# Patient Record
Sex: MALE | Race: WHITE | NOT HISPANIC OR LATINO | Employment: OTHER | URBAN - METROPOLITAN AREA
[De-identification: names, ages, dates, MRNs, and addresses within clinical notes are randomized per-mention and may not be internally consistent; named-entity substitution may affect disease eponyms.]

---

## 2024-04-15 ENCOUNTER — TELEPHONE (OUTPATIENT)
Dept: UROLOGY | Facility: CLINIC | Age: 54
End: 2024-04-15
Payer: COMMERCIAL

## 2024-04-15 NOTE — TELEPHONE ENCOUNTER
----- Message from Jacquie Brunner sent at 4/15/2024  7:32 AM CDT -----  Contact: Pt's wife  Type: Needs Medical Advice    Who Called:  Patient's wife  What is this regarding?:  She is okay seeing another Dr or Np if he can get in before 5/8/24.  Best Call Back Number:  [phone]  Additional Information:  Please call the patient back at the phone number listed above to advise. Thank you!

## 2024-04-24 ENCOUNTER — TELEPHONE (OUTPATIENT)
Dept: UROLOGY | Facility: CLINIC | Age: 54
End: 2024-04-24
Payer: COMMERCIAL

## 2024-04-24 NOTE — TELEPHONE ENCOUNTER
----- Message from Renita Peter sent at 4/24/2024 11:11 AM CDT -----  Contact: Self  Type:  Patient Returning Call    Who Called:  Patient  Who Left Message for Patient:  Dalia  Does the patient know what this is regarding?:  yes  Best Call Back Number:  [phone]  Additional Information:  Thank You

## 2024-04-30 ENCOUNTER — TELEPHONE (OUTPATIENT)
Dept: UROLOGY | Facility: CLINIC | Age: 54
End: 2024-04-30
Payer: COMMERCIAL

## 2024-04-30 ENCOUNTER — OFFICE VISIT (OUTPATIENT)
Dept: UROLOGY | Facility: CLINIC | Age: 54
End: 2024-04-30
Payer: COMMERCIAL

## 2024-04-30 VITALS — HEIGHT: 65 IN | WEIGHT: 175.25 LBS | BODY MASS INDEX: 29.2 KG/M2

## 2024-04-30 DIAGNOSIS — Z87.440 RECENT URINARY TRACT INFECTION: ICD-10-CM

## 2024-04-30 DIAGNOSIS — R97.20 ELEVATED PSA: Primary | ICD-10-CM

## 2024-04-30 LAB
BILIRUBIN, UA POC OHS: NEGATIVE
BLOOD, UA POC OHS: NEGATIVE
CLARITY, UA POC OHS: CLEAR
COLOR, UA POC OHS: YELLOW
GLUCOSE, UA POC OHS: NEGATIVE
KETONES, UA POC OHS: NEGATIVE
LEUKOCYTES, UA POC OHS: NEGATIVE
NITRITE, UA POC OHS: NEGATIVE
PH, UA POC OHS: 6
PROTEIN, UA POC OHS: NEGATIVE
SPECIFIC GRAVITY, UA POC OHS: 1.01
UROBILINOGEN, UA POC OHS: 0.2

## 2024-04-30 PROCEDURE — 4010F ACE/ARB THERAPY RXD/TAKEN: CPT | Mod: CPTII,S$GLB,, | Performed by: STUDENT IN AN ORGANIZED HEALTH CARE EDUCATION/TRAINING PROGRAM

## 2024-04-30 PROCEDURE — 99999 PR PBB SHADOW E&M-EST. PATIENT-LVL III: CPT | Mod: PBBFAC,,, | Performed by: STUDENT IN AN ORGANIZED HEALTH CARE EDUCATION/TRAINING PROGRAM

## 2024-04-30 PROCEDURE — 1159F MED LIST DOCD IN RCRD: CPT | Mod: CPTII,S$GLB,, | Performed by: STUDENT IN AN ORGANIZED HEALTH CARE EDUCATION/TRAINING PROGRAM

## 2024-04-30 PROCEDURE — 81003 URINALYSIS AUTO W/O SCOPE: CPT | Mod: QW,S$GLB,, | Performed by: STUDENT IN AN ORGANIZED HEALTH CARE EDUCATION/TRAINING PROGRAM

## 2024-04-30 PROCEDURE — 1160F RVW MEDS BY RX/DR IN RCRD: CPT | Mod: CPTII,S$GLB,, | Performed by: STUDENT IN AN ORGANIZED HEALTH CARE EDUCATION/TRAINING PROGRAM

## 2024-04-30 PROCEDURE — 3008F BODY MASS INDEX DOCD: CPT | Mod: CPTII,S$GLB,, | Performed by: STUDENT IN AN ORGANIZED HEALTH CARE EDUCATION/TRAINING PROGRAM

## 2024-04-30 PROCEDURE — 99204 OFFICE O/P NEW MOD 45 MIN: CPT | Mod: S$GLB,,, | Performed by: STUDENT IN AN ORGANIZED HEALTH CARE EDUCATION/TRAINING PROGRAM

## 2024-04-30 RX ORDER — METHYLPREDNISOLONE 4 MG/1
4 TABLET ORAL
COMMUNITY

## 2024-04-30 RX ORDER — SULFAMETHOXAZOLE AND TRIMETHOPRIM 800; 160 MG/1; MG/1
1 TABLET ORAL EVERY 12 HOURS
COMMUNITY
End: 2024-05-13 | Stop reason: ALTCHOICE

## 2024-04-30 RX ORDER — CIPROFLOXACIN 500 MG/1
500 TABLET ORAL 2 TIMES DAILY
COMMUNITY
Start: 2024-04-23

## 2024-04-30 NOTE — PROGRESS NOTES
"[place] - Urology   Clinic Note    Subjective:     Chief Complaint: Elevated PSA (2 mo's PSA 8.2)      History of Present Illness:  Richard Medley is a 53 y.o. male who presents to clinic for evaluation and management of an elevated PSA and abnormal MRI. He is new to our clinic.    He has had elevated PSA values which were reviewed and listed below. He did have an infection around the time of the first PSA which was treated with antibiotics. PSA continued to rise prompting an  MRI.      PSA Total PSA, Free PSA, Free %   Latest Ref Rng 0.00 - 4.00 ng/mL 0.00 - 1.50 ng/mL Not established %   12/4/2023 6.1 (H)  1.01  16.56    3/1/2024 8.2 (H)        He has had an MRI of the prostate from 3/27/2024 showing 2 separate PI-RADS 4 lesions, left base TZ and mid TZ. No PZ lesions. He has not undergone a previous TRUS biopsy. He has no known family history of prostate cancer    He reports a weak urinary stream which is worse in the morning. Symptoms are better during the day. IPSS 5/3.     Past medical, family, surgical and social history reviewed as documented in chart with pertinent positive medical, family, surgical and social history detailed in HPI.    A review systems was conducted with pertinent positive and negative findings documented in HPI.    Objective:     Estimated body mass index is 29.17 kg/m² as calculated from the following:    Height as of this encounter: 5' 5" (1.651 m).    Weight as of this encounter: 79.5 kg (175 lb 4.3 oz).    Vital Signs (Most Recent)       Physical Exam  Constitutional:       General: He is not in acute distress.     Appearance: He is not ill-appearing or toxic-appearing.   Pulmonary:      Effort: Pulmonary effort is normal. No accessory muscle usage or respiratory distress.   Neurological:      Mental Status: He is alert.         Labs reviewed below:  Lab Results   Component Value Date    BUN 25 (H) 12/04/2023    CREATININE 0.85 12/04/2023    WBC 4.59 12/04/2023    HGB 14.1 " 12/04/2023    HCT 44.4 12/04/2023     12/04/2023    AST 23 12/04/2023    ALT 19 12/04/2023    ALKPHOS 46 12/04/2023    ALBUMIN 4.3 12/04/2023    ALBUMIN 4.3 12/04/2023    HGBA1C 5.6 12/04/2023        PSA trend reviewed below:  Lab Results   Component Value Date    PSADIAG 8.2 (H) 03/01/2024    PSAFREE 1.01 12/04/2023    PSAFREEPCT 16.56 12/04/2023      Urine dipstick today was negative for blood, leukocytes, and nitrites.     Assessment:     1. Elevated PSA    2. Recent urinary tract infection      Plan:     PSA values were discussed. PSA can be elevated in both benign and malignant conditions. We discussed causes of an elevated PSA, and the issues of sensitivity, specificity and the predictive values. We discussed the benefits and limitations of PSA testing/screening, and the utility of PSA screening, specifically in identifying patients at risk for a significant prostate cancer. We also reviewed the decreased utility in screening older patients.     We reviewed the MRI and discussed options  Recommend prostate biopsy with uronav fusion  We discussed the risks and benefits of transrectal ultrasound-guided prostate biopsy. Discussed the possibility of infection, rectal bleeding, gross hematuria, hematospermia. He was given the chance to ask questions. Alternatives discussed. He has agreed to proceed with TRUS biopsy.     Dawit Olivares MD

## 2024-05-16 PROBLEM — R97.20 ELEVATED PSA: Status: ACTIVE | Noted: 2024-05-16

## 2024-05-20 ENCOUNTER — TELEPHONE (OUTPATIENT)
Dept: UROLOGY | Facility: CLINIC | Age: 54
End: 2024-05-20
Payer: COMMERCIAL

## 2024-05-20 NOTE — TELEPHONE ENCOUNTER
----- Message from Dawit Olivares MD sent at 5/20/2024  9:01 AM CDT -----  Left VM with pathology results. Please arrange PSA in 6 months with a visit afterward

## 2024-11-20 ENCOUNTER — LAB VISIT (OUTPATIENT)
Dept: LAB | Facility: HOSPITAL | Age: 54
End: 2024-11-20
Attending: STUDENT IN AN ORGANIZED HEALTH CARE EDUCATION/TRAINING PROGRAM
Payer: COMMERCIAL

## 2024-11-20 DIAGNOSIS — R97.20 ELEVATED PSA: ICD-10-CM

## 2024-11-20 LAB — COMPLEXED PSA SERPL-MCNC: 8.1 NG/ML (ref 0–4)

## 2024-11-20 PROCEDURE — 36415 COLL VENOUS BLD VENIPUNCTURE: CPT | Mod: PO | Performed by: STUDENT IN AN ORGANIZED HEALTH CARE EDUCATION/TRAINING PROGRAM

## 2024-11-20 PROCEDURE — 84153 ASSAY OF PSA TOTAL: CPT | Performed by: STUDENT IN AN ORGANIZED HEALTH CARE EDUCATION/TRAINING PROGRAM

## 2024-12-26 ENCOUNTER — PATIENT MESSAGE (OUTPATIENT)
Facility: CLINIC | Age: 54
End: 2024-12-26
Payer: COMMERCIAL

## 2024-12-26 ENCOUNTER — TELEPHONE (OUTPATIENT)
Facility: CLINIC | Age: 54
End: 2024-12-26
Payer: COMMERCIAL

## 2024-12-26 NOTE — TELEPHONE ENCOUNTER
----- Message from [email or web address] sent at 12/26/2024  4:12 PM CST -----  Contact: Richard  Type:  Sooner Apoointment Request    Caller is requesting a sooner appointment.  Caller declined first available appointment listed below.  Caller will not accept being placed on the waitlist and is requesting a message be sent to doctor.  Name of Caller:Richard  When is the first available appointment?None  Symptoms:Blisters on fingers  Would the patient rather a call back or a response via [employer]? Call back  Best Call Back Number:[phone]  Additional Information: Richard is calling to schedule a new patient appointment    Thanks  OSWALDO

## 2024-12-31 ENCOUNTER — OFFICE VISIT (OUTPATIENT)
Dept: UROLOGY | Facility: CLINIC | Age: 54
End: 2024-12-31
Payer: COMMERCIAL

## 2024-12-31 VITALS — WEIGHT: 184.31 LBS | HEIGHT: 65 IN | BODY MASS INDEX: 30.71 KG/M2

## 2024-12-31 DIAGNOSIS — R97.20 ELEVATED PSA: Primary | ICD-10-CM

## 2024-12-31 LAB
BILIRUBIN, UA POC OHS: NEGATIVE
BLOOD, UA POC OHS: NEGATIVE
CLARITY, UA POC OHS: CLEAR
COLOR, UA POC OHS: YELLOW
GLUCOSE, UA POC OHS: NEGATIVE
KETONES, UA POC OHS: NEGATIVE
LEUKOCYTES, UA POC OHS: NEGATIVE
NITRITE, UA POC OHS: NEGATIVE
PH, UA POC OHS: 5.5
PROTEIN, UA POC OHS: NEGATIVE
SPECIFIC GRAVITY, UA POC OHS: 1.02
UROBILINOGEN, UA POC OHS: 0.2

## 2024-12-31 PROCEDURE — 1160F RVW MEDS BY RX/DR IN RCRD: CPT | Mod: CPTII,S$GLB,, | Performed by: STUDENT IN AN ORGANIZED HEALTH CARE EDUCATION/TRAINING PROGRAM

## 2024-12-31 PROCEDURE — 81003 URINALYSIS AUTO W/O SCOPE: CPT | Mod: QW,S$GLB,, | Performed by: STUDENT IN AN ORGANIZED HEALTH CARE EDUCATION/TRAINING PROGRAM

## 2024-12-31 PROCEDURE — 99214 OFFICE O/P EST MOD 30 MIN: CPT | Mod: S$GLB,,, | Performed by: STUDENT IN AN ORGANIZED HEALTH CARE EDUCATION/TRAINING PROGRAM

## 2024-12-31 PROCEDURE — 99999 PR PBB SHADOW E&M-EST. PATIENT-LVL III: CPT | Mod: PBBFAC,,, | Performed by: STUDENT IN AN ORGANIZED HEALTH CARE EDUCATION/TRAINING PROGRAM

## 2024-12-31 PROCEDURE — 1159F MED LIST DOCD IN RCRD: CPT | Mod: CPTII,S$GLB,, | Performed by: STUDENT IN AN ORGANIZED HEALTH CARE EDUCATION/TRAINING PROGRAM

## 2024-12-31 PROCEDURE — 3008F BODY MASS INDEX DOCD: CPT | Mod: CPTII,S$GLB,, | Performed by: STUDENT IN AN ORGANIZED HEALTH CARE EDUCATION/TRAINING PROGRAM

## 2024-12-31 PROCEDURE — 4010F ACE/ARB THERAPY RXD/TAKEN: CPT | Mod: CPTII,S$GLB,, | Performed by: STUDENT IN AN ORGANIZED HEALTH CARE EDUCATION/TRAINING PROGRAM

## 2024-12-31 NOTE — PROGRESS NOTES
"[place] - Urology   Clinic Note    Subjective:     Chief Complaint: Follow-up      History of Present Illness:  Richard Medley is a 54 y.o. male who presents to clinic for evaluation and management of an elevated PSA and abnormal MRI. He is new to our clinic.    He has had elevated PSA values which were reviewed and listed below. He did have an infection around the time of the first PSA which was treated with antibiotics. PSA continued to rise prompting an  MRI.  MRI of the prostate from 3/27/2024 showing 2 separate PI-RADS 4 lesions, left base TZ and mid TZ. No PZ lesions.  Prostate 50.5 cc.  There were scattered findings concerning for chronic prostatitis on the MRI.  Subsequent Uronav biopsy was benign at both targets and the templated biopsy. He has no known family history of prostate cancer     PSA Total Prostate Specific Antigen Free PSA % Free   Latest Ref Rng 0.00 - 4.00 ng/mL 0.00 - 1.50 ng/mL Not established %   12/4/2023 6.1 (H)  1.01  16.56    3/1/2024 8.2 (H)     11/20/2024 8.1 (H)        He reports a weak urinary stream which is worse in the morning. Symptoms are better during the day. IPSS 5/3.     Past medical, family, surgical and social history reviewed as documented in chart with pertinent positive medical, family, surgical and social history detailed in HPI.    A review systems was conducted with pertinent positive and negative findings documented in HPI.    Objective:     Estimated body mass index is 30.67 kg/m² as calculated from the following:    Height as of this encounter: 5' 5" (1.651 m).    Weight as of this encounter: 83.6 kg (184 lb 4.9 oz).    Vital Signs (Most Recent)       Physical Exam  Constitutional:       General: He is not in acute distress.     Appearance: He is not ill-appearing or toxic-appearing.   Pulmonary:      Effort: Pulmonary effort is normal. No accessory muscle usage or respiratory distress.   Genitourinary:     Comments: Prostate 50 g no nodules  Neurological:      " Mental Status: He is alert.         Labs reviewed below:  Lab Results   Component Value Date    BUN 24 (H) 05/13/2024    CREATININE 0.88 05/13/2024    WBC 4.59 12/04/2023    HGB 14.8 05/13/2024    HCT 44.4 12/04/2023     12/04/2023    AST 23 12/04/2023    ALT 19 12/04/2023    ALKPHOS 46 12/04/2023    ALBUMIN 4.3 12/04/2023    ALBUMIN 4.3 12/04/2023    HGBA1C 5.6 12/04/2023        PSA trend reviewed below:  Lab Results   Component Value Date    PSADIAG 8.1 (H) 11/20/2024    PSADIAG 8.2 (H) 03/01/2024    PSAFREE 1.01 12/04/2023    PSAFREEPCT 16.56 12/04/2023      Urine dipstick today was negative for blood, leukocytes, and nitrites.     Assessment:     1. Elevated PSA      Plan:     PSA values were discussed. PSA can be elevated in both benign and malignant conditions. We discussed causes of an elevated PSA, and the issues of sensitivity, specificity and the predictive values. We discussed the benefits and limitations of PSA testing/screening, and the utility of PSA screening, specifically in identifying patients at risk for a significant prostate cancer. We also reviewed the decreased utility in screening older patients.     PSA remains significantly elevated however relatively stable from previous.  The biopsy was benign.  We discussed possibility of undiagnosed prostate cancer versus or benign etiologies of his elevated PSA as above.  Recommend repeat PSA and MRI in 3 months with consideration for additional biopsy    Follow up in 3 months    Dawit Olivares MD

## 2025-03-24 ENCOUNTER — PATIENT MESSAGE (OUTPATIENT)
Dept: UROLOGY | Facility: CLINIC | Age: 55
End: 2025-03-24
Payer: COMMERCIAL

## 2025-03-24 ENCOUNTER — HOSPITAL ENCOUNTER (OUTPATIENT)
Dept: RADIOLOGY | Facility: HOSPITAL | Age: 55
Discharge: HOME | End: 2025-03-24
Attending: STUDENT IN AN ORGANIZED HEALTH CARE EDUCATION/TRAINING PROGRAM
Payer: COMMERCIAL

## 2025-03-24 ENCOUNTER — RESULTS FOLLOW-UP (OUTPATIENT)
Dept: UROLOGY | Facility: CLINIC | Age: 55
End: 2025-03-24

## 2025-03-24 DIAGNOSIS — R97.20 ELEVATED PSA: ICD-10-CM

## 2025-03-24 PROCEDURE — 72197 MRI PELVIS W/O & W/DYE: CPT | Mod: TC,PO

## 2025-03-24 PROCEDURE — A9585 GADOBUTROL INJECTION: HCPCS | Mod: PO | Performed by: STUDENT IN AN ORGANIZED HEALTH CARE EDUCATION/TRAINING PROGRAM

## 2025-03-24 PROCEDURE — 72197 MRI PELVIS W/O & W/DYE: CPT | Mod: 26,,, | Performed by: RADIOLOGY

## 2025-03-24 PROCEDURE — 25500020 PHARM REV CODE 255: Mod: PO | Performed by: STUDENT IN AN ORGANIZED HEALTH CARE EDUCATION/TRAINING PROGRAM

## 2025-03-24 RX ORDER — GADOBUTROL 604.72 MG/ML
8 INJECTION INTRAVENOUS
Status: COMPLETED | OUTPATIENT
Start: 2025-03-24 | End: 2025-03-24

## 2025-03-24 RX ADMIN — GADOBUTROL 8 ML: 604.72 INJECTION INTRAVENOUS at 09:03

## 2025-04-01 ENCOUNTER — TELEPHONE (OUTPATIENT)
Dept: UROLOGY | Facility: CLINIC | Age: 55
End: 2025-04-01

## 2025-04-01 ENCOUNTER — OFFICE VISIT (OUTPATIENT)
Dept: UROLOGY | Facility: CLINIC | Age: 55
End: 2025-04-01
Payer: COMMERCIAL

## 2025-04-01 VITALS — BODY MASS INDEX: 30.16 KG/M2 | HEIGHT: 66 IN | WEIGHT: 187.63 LBS

## 2025-04-01 DIAGNOSIS — R97.20 ELEVATED PSA: Primary | ICD-10-CM

## 2025-04-01 PROCEDURE — 99214 OFFICE O/P EST MOD 30 MIN: CPT | Mod: S$GLB,,, | Performed by: STUDENT IN AN ORGANIZED HEALTH CARE EDUCATION/TRAINING PROGRAM

## 2025-04-01 PROCEDURE — 99999 PR PBB SHADOW E&M-EST. PATIENT-LVL III: CPT | Mod: PBBFAC,,, | Performed by: STUDENT IN AN ORGANIZED HEALTH CARE EDUCATION/TRAINING PROGRAM

## 2025-04-01 PROCEDURE — 1159F MED LIST DOCD IN RCRD: CPT | Mod: CPTII,S$GLB,, | Performed by: STUDENT IN AN ORGANIZED HEALTH CARE EDUCATION/TRAINING PROGRAM

## 2025-04-01 PROCEDURE — 3008F BODY MASS INDEX DOCD: CPT | Mod: CPTII,S$GLB,, | Performed by: STUDENT IN AN ORGANIZED HEALTH CARE EDUCATION/TRAINING PROGRAM

## 2025-04-01 PROCEDURE — 3044F HG A1C LEVEL LT 7.0%: CPT | Mod: CPTII,S$GLB,, | Performed by: STUDENT IN AN ORGANIZED HEALTH CARE EDUCATION/TRAINING PROGRAM

## 2025-04-01 PROCEDURE — 1160F RVW MEDS BY RX/DR IN RCRD: CPT | Mod: CPTII,S$GLB,, | Performed by: STUDENT IN AN ORGANIZED HEALTH CARE EDUCATION/TRAINING PROGRAM

## 2025-04-01 NOTE — PROGRESS NOTES
"[place] - Urology   Clinic Note    Subjective:     Chief Complaint: Follow-up      History of Present Illness    CHIEF COMPLAINT:  Richard presents today for follow up of elevated PSA and concerning MRI findings    3/2024: MRI showed 2 PI-RADS 4 lesions - left base PZ, right mid TZ  5/2024: Uronav biopsy benign  3/2025: MRI showed PI-RADS 5 lesion right TZ, unchanged in size, no EPE.     He reports experiencing a weaker urinary stream and taking longer to urinate. He denies any pain associated with urination.    FAMILY HISTORY:  He denies any family history of prostate cancer.         PSA Total PSA % Free   Latest Ref Rng 0.00 - 4.00 ng/mL Not established %   12/4/2023 6.1 (H)  16.56    3/1/2024 8.2 (H)     11/20/2024 8.1 (H)     3/18/2025 12.6 (H)  10.16      Past medical, family, surgical and social history reviewed as documented in chart with pertinent positive medical, family, surgical and social history detailed in HPI.    A review systems was conducted with pertinent positive and negative findings documented in HPI.    Objective:     Estimated body mass index is 30.28 kg/m² as calculated from the following:    Height as of this encounter: 5' 6" (1.676 m).    Weight as of this encounter: 85.1 kg (187 lb 9.8 oz).    Vital Signs (Most Recent)       General: No acute distress, well developed.   Head: Normocephalic, atraumatic  CV: no cyanosis  Lungs: normal respiratory effort, no respiratory distress    Labs reviewed below:  Lab Results   Component Value Date    BUN 18 03/18/2025    CREATININE 0.85 03/18/2025    WBC 5.12 03/18/2025    HGB 14.3 03/18/2025    HCT 43.4 03/18/2025     03/18/2025    AST 24 03/18/2025    ALT 30 03/18/2025    ALKPHOS 63 03/18/2025    ALBUMIN 4.3 03/18/2025    HGBA1C 5.5 03/18/2025        PSA trend reviewed below:  Lab Results   Component Value Date    PSADIAG 8.1 (H) 11/20/2024    PSADIAG 8.2 (H) 03/01/2024    PSAFREE 1.28 03/18/2025    PSAFREE 1.01 12/04/2023    PSAFREEPCT 10.16 " 03/18/2025    PSAFREEPCT 16.56 12/04/2023      Assessment:     1. Elevated PSA      Plan:     Assessment & Plan    - Recommend repeat prostate biopsy due to concerning MRI findings (Pirads 5 lesion) and elevated PSA (12.6) with lower free PSA percentage.  - Plan for transperineal biopsy approach for better sampling of anterior lesion and lower infection risk.  - Considered possibility of underlying cancer despite previous benign biopsy results.  - Plan to sample targeted area and systematic biopsy    - Explained transperineal biopsy procedure, including incision location and sampling method.  - Discussed potential post-biopsy symptoms: urinary changes, blood in urine/ejaculate.  - Informed about biopsy results timeline (usually within a week).    - Ordered transperineal prostate biopsy.  - Outlined possible next steps based on biopsy results, including potential need for further imaging or consultation with radiation oncologist.    - Follow up for prostate biopsy next week       Portions of this note were generated by [employer] and [employer] Direct dictation services    Dawit Olivares MD

## 2025-04-08 ENCOUNTER — TELEPHONE (OUTPATIENT)
Dept: UROLOGY | Facility: CLINIC | Age: 55
End: 2025-04-08
Payer: COMMERCIAL

## 2025-04-08 NOTE — TELEPHONE ENCOUNTER
----- Message from Med Assistant iRley sent at 4/7/2025  2:56 PM CDT -----  Contact: self    ----- Message -----  From: Chung Silvestre  Sent: 4/7/2025   2:46 PM CDT  To: Marshall Pastor Staff    Type: Needs Medical AdviceWho Called:  PTBest Call Back Number: [phone] Additional Information: Please call PT regarding 04/09 procedure.

## 2025-04-11 ENCOUNTER — TELEPHONE (OUTPATIENT)
Dept: UROLOGY | Facility: CLINIC | Age: 55
End: 2025-04-11
Payer: COMMERCIAL

## 2025-05-19 ENCOUNTER — PATIENT MESSAGE (OUTPATIENT)
Dept: UROLOGY | Facility: CLINIC | Age: 55
End: 2025-05-19
Payer: COMMERCIAL

## 2025-05-19 DIAGNOSIS — R97.20 ELEVATED PSA: Primary | ICD-10-CM

## 2025-06-04 ENCOUNTER — LAB VISIT (OUTPATIENT)
Dept: LAB | Facility: HOSPITAL | Age: 55
End: 2025-06-04
Attending: STUDENT IN AN ORGANIZED HEALTH CARE EDUCATION/TRAINING PROGRAM
Payer: COMMERCIAL

## 2025-06-04 DIAGNOSIS — R97.20 ELEVATED PSA: ICD-10-CM

## 2025-06-04 LAB
PSA SERPL-MCNC: 9.25 NG/ML
TESTOST SERPL-MCNC: 394 NG/DL (ref 304–1227)

## 2025-06-04 PROCEDURE — 84153 ASSAY OF PSA TOTAL: CPT

## 2025-06-04 PROCEDURE — 36415 COLL VENOUS BLD VENIPUNCTURE: CPT | Mod: PO

## 2025-06-04 PROCEDURE — 84403 ASSAY OF TOTAL TESTOSTERONE: CPT

## 2025-06-09 ENCOUNTER — OFFICE VISIT (OUTPATIENT)
Dept: UROLOGY | Facility: CLINIC | Age: 55
End: 2025-06-09
Payer: COMMERCIAL

## 2025-06-09 ENCOUNTER — TELEPHONE (OUTPATIENT)
Dept: UROLOGY | Facility: CLINIC | Age: 55
End: 2025-06-09

## 2025-06-09 VITALS — BODY MASS INDEX: 31.96 KG/M2 | HEIGHT: 65 IN | WEIGHT: 191.81 LBS

## 2025-06-09 DIAGNOSIS — Z87.440 RECENT URINARY TRACT INFECTION: ICD-10-CM

## 2025-06-09 DIAGNOSIS — R97.20 ELEVATED PSA: Primary | ICD-10-CM

## 2025-06-09 PROCEDURE — 3044F HG A1C LEVEL LT 7.0%: CPT | Mod: CPTII,S$GLB,, | Performed by: STUDENT IN AN ORGANIZED HEALTH CARE EDUCATION/TRAINING PROGRAM

## 2025-06-09 PROCEDURE — 99999 PR PBB SHADOW E&M-EST. PATIENT-LVL III: CPT | Mod: PBBFAC,,, | Performed by: STUDENT IN AN ORGANIZED HEALTH CARE EDUCATION/TRAINING PROGRAM

## 2025-06-09 PROCEDURE — 99214 OFFICE O/P EST MOD 30 MIN: CPT | Mod: S$GLB,,, | Performed by: STUDENT IN AN ORGANIZED HEALTH CARE EDUCATION/TRAINING PROGRAM

## 2025-06-09 PROCEDURE — 1159F MED LIST DOCD IN RCRD: CPT | Mod: CPTII,S$GLB,, | Performed by: STUDENT IN AN ORGANIZED HEALTH CARE EDUCATION/TRAINING PROGRAM

## 2025-06-09 PROCEDURE — 1160F RVW MEDS BY RX/DR IN RCRD: CPT | Mod: CPTII,S$GLB,, | Performed by: STUDENT IN AN ORGANIZED HEALTH CARE EDUCATION/TRAINING PROGRAM

## 2025-06-09 PROCEDURE — 3008F BODY MASS INDEX DOCD: CPT | Mod: CPTII,S$GLB,, | Performed by: STUDENT IN AN ORGANIZED HEALTH CARE EDUCATION/TRAINING PROGRAM

## 2025-06-09 RX ORDER — DOXYCYCLINE 100 MG/1
100 CAPSULE ORAL EVERY 12 HOURS
Qty: 60 CAPSULE | Refills: 0 | Status: SHIPPED | OUTPATIENT
Start: 2025-06-09 | End: 2025-07-09

## 2025-06-09 NOTE — PROGRESS NOTES
"[place] - Urology   Clinic Note    Subjective:     Chief Complaint: Follow-up and Elevated PSA      History of Present Illness    CHIEF COMPLAINT:  Richard presents today for follow up of elevated PSA and concerning MRI findings    3/2024: MRI showed 2 PI-RADS 4 lesions - left base PZ, right mid TZ  5/2024: Uronav biopsy benign  3/2025: MRI showed PI-RADS 5 lesion right TZ, unchanged in size, no EPE.   4/2025: Uronav TP biopsy benign    He reports experiencing a weaker urinary stream and taking longer to urinate. He denies any pain associated with urination.    He also reports new onset of fatigue especially later in the day.  He has somewhat lower libido.  His wife has recently been placed on testosterone replacement.    FAMILY HISTORY:  He denies any family history of prostate cancer.         PSA Total PSA % Free   Latest Ref Rng 0.00 - 4.00 ng/mL Not established %   12/4/2023 6.1 (H)  16.56    3/1/2024 8.2 (H)     11/20/2024 8.1 (H)     3/18/2025 12.6 (H)  10.16      6/4/2025 9.25 (H)          Testosterone, Total   6/4/2025 394          Past medical, family, surgical and social history reviewed as documented in chart with pertinent positive medical, family, surgical and social history detailed in HPI.    A review systems was conducted with pertinent positive and negative findings documented in HPI.    Objective:     Estimated body mass index is 31.92 kg/m² as calculated from the following:    Height as of this encounter: 5' 5" (1.651 m).    Weight as of this encounter: 87 kg (191 lb 12.8 oz).    Vital Signs (Most Recent)       General: No acute distress, well developed.   Head: Normocephalic, atraumatic  CV: no cyanosis  Lungs: normal respiratory effort, no respiratory distress    Labs reviewed below:  Lab Results   Component Value Date    BUN 18 03/18/2025    CREATININE 0.85 03/18/2025    WBC 5.12 03/18/2025    HGB 14.3 03/18/2025    HCT 43.4 03/18/2025     03/18/2025    AST 24 03/18/2025    ALT 30 " 03/18/2025    ALKPHOS 63 03/18/2025    ALBUMIN 4.3 03/18/2025    HGBA1C 5.5 03/18/2025        PSA trend reviewed below:  Lab Results   Component Value Date    PSA 9.25 (H) 06/04/2025    PSADIAG 8.1 (H) 11/20/2024    PSADIAG 8.2 (H) 03/01/2024    PSAFREE 1.28 03/18/2025    PSAFREE 1.01 12/04/2023    PSAFREEPCT 10.16 03/18/2025    PSAFREEPCT 16.56 12/04/2023      Assessment:     1. Elevated PSA    2. Recent urinary tract infection        Plan:     Assessment & Plan    - Evaluated PSA and previous biopsy results, noting persistent elevation without clear explanation.  - Assessed potential impact of testosterone on PSA and prostate cancer risk.  - Considered possibility of prostate infection or inflammation as cause for elevated PSA.    - Explained normal testosterone ranges and treatment guidelines.  - Discussed potential impacts of testosterone replacement on PSA and prostate cancer risk.    - Ordered urinalysis and urine culture to check for infection.  - Ordered PSA test to be completed in 6 weeks.    - Started doxycycline for trial of antibiotic therapy to address potential prostate infection/inflammation.    - Follow up in 6 weeks for repeat PSA test and to discuss results, including discussion about testosterone therapy.          Portions of this note were generated by [employer] Direct dictation services    Dawit Olivares MD

## 2025-06-09 NOTE — TELEPHONE ENCOUNTER
No urine on the patient, but the doctor ordered a urine culture and micro. According to the doctor's order, the patient should be called and asked to provide a urine sample to the lab and refrain from starting antibiotics until he sees findings. Pt expressed comprehension verbally.

## 2025-06-10 ENCOUNTER — LAB VISIT (OUTPATIENT)
Dept: LAB | Facility: HOSPITAL | Age: 55
End: 2025-06-10
Attending: STUDENT IN AN ORGANIZED HEALTH CARE EDUCATION/TRAINING PROGRAM
Payer: COMMERCIAL

## 2025-06-10 DIAGNOSIS — Z87.440 RECENT URINARY TRACT INFECTION: ICD-10-CM

## 2025-06-10 DIAGNOSIS — R97.20 ELEVATED PSA: ICD-10-CM

## 2025-06-10 LAB
BACTERIA #/AREA URNS HPF: ABNORMAL /HPF
HYALINE CASTS #/AREA URNS LPF: 1 /LPF (ref 0–1)
MICROSCOPIC COMMENT: ABNORMAL
SQUAMOUS #/AREA URNS HPF: 1 /HPF
WBC #/AREA URNS HPF: 1 /HPF (ref 0–5)

## 2025-06-10 PROCEDURE — 81000 URINALYSIS NONAUTO W/SCOPE: CPT | Mod: PO

## 2025-06-10 PROCEDURE — 87086 URINE CULTURE/COLONY COUNT: CPT

## 2025-06-11 LAB — BACTERIA UR CULT: NO GROWTH

## 2025-07-21 ENCOUNTER — LAB VISIT (OUTPATIENT)
Dept: LAB | Facility: HOSPITAL | Age: 55
End: 2025-07-21
Attending: STUDENT IN AN ORGANIZED HEALTH CARE EDUCATION/TRAINING PROGRAM
Payer: COMMERCIAL

## 2025-07-21 DIAGNOSIS — Z87.440 RECENT URINARY TRACT INFECTION: ICD-10-CM

## 2025-07-21 DIAGNOSIS — R97.20 ELEVATED PSA: ICD-10-CM

## 2025-07-21 LAB — PSA SERPL-MCNC: 5.84 NG/ML

## 2025-07-21 PROCEDURE — 36415 COLL VENOUS BLD VENIPUNCTURE: CPT | Mod: PO

## 2025-07-21 PROCEDURE — 84153 ASSAY OF PSA TOTAL: CPT

## 2025-07-29 ENCOUNTER — OFFICE VISIT (OUTPATIENT)
Dept: UROLOGY | Facility: CLINIC | Age: 55
End: 2025-07-29
Payer: COMMERCIAL

## 2025-07-29 VITALS — BODY MASS INDEX: 32.25 KG/M2 | HEIGHT: 65 IN | WEIGHT: 193.56 LBS

## 2025-07-29 DIAGNOSIS — R53.83 OTHER FATIGUE: ICD-10-CM

## 2025-07-29 DIAGNOSIS — R97.20 ELEVATED PSA: Primary | ICD-10-CM

## 2025-07-29 PROCEDURE — 99214 OFFICE O/P EST MOD 30 MIN: CPT | Mod: S$GLB,,, | Performed by: STUDENT IN AN ORGANIZED HEALTH CARE EDUCATION/TRAINING PROGRAM

## 2025-07-29 PROCEDURE — 3044F HG A1C LEVEL LT 7.0%: CPT | Mod: CPTII,S$GLB,, | Performed by: STUDENT IN AN ORGANIZED HEALTH CARE EDUCATION/TRAINING PROGRAM

## 2025-07-29 PROCEDURE — 3008F BODY MASS INDEX DOCD: CPT | Mod: CPTII,S$GLB,, | Performed by: STUDENT IN AN ORGANIZED HEALTH CARE EDUCATION/TRAINING PROGRAM

## 2025-07-29 PROCEDURE — 99999 PR PBB SHADOW E&M-EST. PATIENT-LVL III: CPT | Mod: PBBFAC,,, | Performed by: STUDENT IN AN ORGANIZED HEALTH CARE EDUCATION/TRAINING PROGRAM

## 2025-07-29 PROCEDURE — 1159F MED LIST DOCD IN RCRD: CPT | Mod: CPTII,S$GLB,, | Performed by: STUDENT IN AN ORGANIZED HEALTH CARE EDUCATION/TRAINING PROGRAM

## 2025-07-29 NOTE — PROGRESS NOTES
"[place] - Urology   Clinic Note    Subjective:     Chief Complaint: Elevated PSA      History of Present Illness    CHIEF COMPLAINT:  Richard presents today for follow up of elevated PSA and concerning MRI findings    3/2024: MRI showed 2 PI-RADS 4 lesions - left base PZ, right mid TZ  5/2024: Uronav biopsy benign  3/2025: MRI showed PI-RADS 5 lesion right TZ, unchanged in size, no EPE.   4/2025: Uronav TP biopsy benign    He reports experiencing a weaker urinary stream and taking longer to urinate. He denies any pain associated with urination.    He also reports new onset of fatigue especially later in the day.  He has somewhat lower libido.  His wife has recently been placed on testosterone replacement.    IPSS 4/0    FAMILY HISTORY:  He denies any family history of prostate cancer.         PSA Total PSA % Free   Latest Ref Rng 0.00 - 4.00 ng/mL Not established %   12/4/2023 6.1 (H)  16.56    3/1/2024 8.2 (H)     11/20/2024 8.1 (H)     3/18/2025 12.6 (H)  10.16      6/4/2025 9.25 (H)    7/21/2025 5.84 (H)        Testosterone, Total   6/4/2025 394      Past medical, family, surgical and social history reviewed as documented in chart with pertinent positive medical, family, surgical and social history detailed in HPI.    A review systems was conducted with pertinent positive and negative findings documented in HPI.    Objective:     Estimated body mass index is 32.21 kg/m² as calculated from the following:    Height as of this encounter: 5' 5" (1.651 m).    Weight as of this encounter: 87.8 kg (193 lb 9 oz).    Vital Signs (Most Recent)       General: No acute distress, well developed.   Head: Normocephalic, atraumatic  CV: no cyanosis  Lungs: normal respiratory effort, no respiratory distress    Labs reviewed below:  Lab Results   Component Value Date    BUN 18 03/18/2025    CREATININE 0.85 03/18/2025    WBC 5.12 03/18/2025    HGB 14.3 03/18/2025    HCT 43.4 03/18/2025     03/18/2025    AST 24 03/18/2025    " ALT 30 03/18/2025    ALKPHOS 63 03/18/2025    ALBUMIN 4.3 03/18/2025    HGBA1C 5.5 03/18/2025        PSA trend reviewed below:  Lab Results   Component Value Date    PSA 5.84 (H) 07/21/2025    PSA 9.25 (H) 06/04/2025    PSADIAG 8.1 (H) 11/20/2024    PSADIAG 8.2 (H) 03/01/2024    PSAFREE 1.28 03/18/2025    PSAFREE 1.01 12/04/2023    PSAFREEPCT 10.16 03/18/2025    PSAFREEPCT 16.56 12/04/2023      Assessment:     1. Elevated PSA    2. Other fatigue      Plan:     Assessment & Plan    - PSA decreased significantly but remains slightly above normal range.  - Decided against testosterone therapy due to PSA trends and current normal testosterone levels.  - Prostate size normal for age, not expecting significant urinary symptoms.    - Ordered PSA test for 6 M    - Follow up in 6 months for reassessment.          Portions of this note were generated by [employer] Direct dictation services    Dawit Olivares MD